# Patient Record
Sex: MALE | Race: WHITE | NOT HISPANIC OR LATINO | URBAN - METROPOLITAN AREA
[De-identification: names, ages, dates, MRNs, and addresses within clinical notes are randomized per-mention and may not be internally consistent; named-entity substitution may affect disease eponyms.]

---

## 2019-09-06 ENCOUNTER — OUTPATIENT (OUTPATIENT)
Dept: OUTPATIENT SERVICES | Facility: HOSPITAL | Age: 59
LOS: 1 days | End: 2019-09-06
Payer: COMMERCIAL

## 2019-09-06 DIAGNOSIS — M54.5 LOW BACK PAIN: ICD-10-CM

## 2019-09-06 DIAGNOSIS — M25.551 PAIN IN RIGHT HIP: ICD-10-CM

## 2019-09-06 PROCEDURE — 72100 X-RAY EXAM L-S SPINE 2/3 VWS: CPT | Mod: 26

## 2019-09-06 PROCEDURE — 73502 X-RAY EXAM HIP UNI 2-3 VIEWS: CPT | Mod: 26,RT

## 2019-09-06 PROCEDURE — 72100 X-RAY EXAM L-S SPINE 2/3 VWS: CPT

## 2019-09-06 PROCEDURE — 73502 X-RAY EXAM HIP UNI 2-3 VIEWS: CPT

## 2019-09-11 ENCOUNTER — APPOINTMENT (OUTPATIENT)
Dept: ORTHOPEDIC SURGERY | Facility: CLINIC | Age: 59
End: 2019-09-11

## 2019-09-18 ENCOUNTER — OUTPATIENT (OUTPATIENT)
Dept: OUTPATIENT SERVICES | Facility: HOSPITAL | Age: 59
LOS: 1 days | End: 2019-09-18
Payer: COMMERCIAL

## 2019-09-18 ENCOUNTER — APPOINTMENT (OUTPATIENT)
Dept: MRI IMAGING | Facility: HOSPITAL | Age: 59
End: 2019-09-18
Payer: COMMERCIAL

## 2019-09-18 DIAGNOSIS — Z00.8 ENCOUNTER FOR OTHER GENERAL EXAMINATION: ICD-10-CM

## 2019-09-18 PROCEDURE — 72148 MRI LUMBAR SPINE W/O DYE: CPT

## 2019-09-18 PROCEDURE — 72148 MRI LUMBAR SPINE W/O DYE: CPT | Mod: 26

## 2020-01-26 ENCOUNTER — TRANSCRIPTION ENCOUNTER (OUTPATIENT)
Age: 60
End: 2020-01-26

## 2020-09-10 ENCOUNTER — TRANSCRIPTION ENCOUNTER (OUTPATIENT)
Age: 60
End: 2020-09-10

## 2022-11-24 ENCOUNTER — NON-APPOINTMENT (OUTPATIENT)
Age: 62
End: 2022-11-24

## 2022-11-25 ENCOUNTER — EMERGENCY (EMERGENCY)
Facility: HOSPITAL | Age: 62
LOS: 1 days | Discharge: HOME | End: 2022-11-25
Attending: EMERGENCY MEDICINE | Admitting: EMERGENCY MEDICINE
Payer: COMMERCIAL

## 2022-11-25 VITALS
SYSTOLIC BLOOD PRESSURE: 168 MMHG | DIASTOLIC BLOOD PRESSURE: 94 MMHG | RESPIRATION RATE: 20 BRPM | TEMPERATURE: 98 F | WEIGHT: 220.02 LBS | OXYGEN SATURATION: 97 % | HEART RATE: 60 BPM | HEIGHT: 70 IN

## 2022-11-25 LAB
RAPID RVP RESULT: SIGNIFICANT CHANGE UP
SARS-COV-2 RNA SPEC QL NAA+PROBE: SIGNIFICANT CHANGE UP

## 2022-11-25 PROCEDURE — 87081 CULTURE SCREEN ONLY: CPT

## 2022-11-25 PROCEDURE — 0225U NFCT DS DNA&RNA 21 SARSCOV2: CPT

## 2022-11-25 PROCEDURE — 99284 EMERGENCY DEPT VISIT MOD MDM: CPT

## 2022-11-25 PROCEDURE — 99283 EMERGENCY DEPT VISIT LOW MDM: CPT

## 2022-11-25 RX ORDER — DIPHENHYDRAMINE HCL 50 MG
50 CAPSULE ORAL ONCE
Refills: 0 | Status: COMPLETED | OUTPATIENT
Start: 2022-11-25 | End: 2022-11-25

## 2022-11-25 RX ORDER — IBUPROFEN 200 MG
1 TABLET ORAL
Qty: 30 | Refills: 0
Start: 2022-11-25

## 2022-11-25 RX ORDER — DIPHENHYDRAMINE HCL 50 MG
1 CAPSULE ORAL
Qty: 20 | Refills: 0
Start: 2022-11-25

## 2022-11-25 RX ORDER — IBUPROFEN 200 MG
600 TABLET ORAL ONCE
Refills: 0 | Status: COMPLETED | OUTPATIENT
Start: 2022-11-25 | End: 2022-11-25

## 2022-11-25 RX ORDER — EPINEPHRINE 0.3 MG/.3ML
0.3 INJECTION INTRAMUSCULAR; SUBCUTANEOUS
Qty: 1 | Refills: 0
Start: 2022-11-25 | End: 2022-11-25

## 2022-11-25 RX ADMIN — Medication 600 MILLIGRAM(S): at 20:39

## 2022-11-25 RX ADMIN — Medication 1 TABLET(S): at 20:39

## 2022-11-25 RX ADMIN — Medication 50 MILLIGRAM(S): at 20:39

## 2022-11-25 RX ADMIN — Medication 60 MILLIGRAM(S): at 20:38

## 2022-11-25 NOTE — ED PROVIDER NOTE - CARE PROVIDER_API CALL
Devante Ojeda)  Otolaryngology  [address] [place] [address]  Phone: [phone]  Fax: [phone]  Follow Up Time: 1-3 Days

## 2022-11-25 NOTE — ED PROVIDER NOTE - OBJECTIVE STATEMENT
Patient complaining of throat discomfort, moderate, acute onset since 10:30 AM today.  Patient was working in his yard and felt acute discomfort in throat.  He thought maybe a bug flew in his mouth.  Associated with mild throat pain.  Feels like something is flapping around in the back of his throat.  No fever chills cough SOB.  No rash hives itch.  No tongue swelling.  No ab pain nausea vomiting diarrhea.  No new medications or foods.  Seen at urgent care and sent to ED for evaluation.

## 2022-11-25 NOTE — ED PROVIDER NOTE - CLINICAL SUMMARY MEDICAL DECISION MAKING FREE TEXT BOX
Patient with acute onset throat discomfort 10:30 AM today with mild throat pain.  Mild to moderately edematous uvula on exam with few palatal petechiae.  Consistent with uvulitis.  Sudden onset makes it more consistent with allergic reaction or angioedema (NO ACEi use).  Throat pain and palatal petechiae may suggest infectious etiology.  Symptoms have remained stable since onset.  No worsening over 10+ hours.  Check throat culture, RVP.  Will treat with Benadryl prednisone Augmentin.  Follow-up ENT.  Prescribed EpiPen and instructed on use.  Told patient to return for any worsening or potential airway compromise.

## 2022-11-25 NOTE — ED ADULT NURSE NOTE - OBJECTIVE STATEMENT
Pt presents to the ED sent in by urgent care for uvulitis. Pt states he has a sore throat and feels like something is in his throat. Denies any difficulty breathing.

## 2022-11-25 NOTE — ED ADULT NURSE NOTE - NS PRO PASSIVE SMOKE EXP
----- Message from Marek Parry MD sent at 2/19/2020  6:26 AM CST -----  Nonfasting hepatic panel is pending if he still wants to do it  ----- Message -----  From: SYSTEM  Sent: 2/19/2020  12:17 AM CST  To: Marek Parry MD       No

## 2022-11-25 NOTE — ED PROVIDER NOTE - NSFOLLOWUPINSTRUCTIONS_ED_ALL_ED_FT
- see ENT (ear nose throat) doctor in next 1-3 days  - take benadryl 50 mg every 6 hrs for allergic reaction/symptoms  - take prednisone daily  - take augmentin antbiotic  - use epiPen for severe allergic reaction, worse throat swelling, difficult breathing, feeling faint and return immediately to any ER.      Uvulitis    WHAT YOU NEED TO KNOW:    Uvulitis is severe swelling of your uvula. The uvula is the small piece of tissue that hangs in the back of your throat. Uvulitis is usually caused by an infection, an injury to the back of the throat, or an allergic reaction.   Mouth Anatomy         DISCHARGE INSTRUCTIONS:    Medicines:   •Antibiotics: You may need antibiotics if an infection caused your uvulitis. This medicine will help fight infection. Take your antibiotics until they are gone, even if you feel better.      •Steroids:  You may need steroid medicine if an allergic reaction caused your uvulitis. This medicine helps decrease redness, pain, and swelling.      •Antihistamines: You may need antihistamines if an allergic reaction caused your uvulitis. This medicine helps decrease itching.       •Take your medicine as directed. Contact your healthcare provider if you think your medicine is not helping or if you have side effects. Tell your provider if you are allergic to any medicine. Keep a list of the medicines, vitamins, and herbs you take. Include the amounts, and when and why you take them. Bring the list or the pill bottles to follow-up visits. Carry your medicine list with you in case of an emergency.      Follow up with your healthcare provider as directed: Write down your questions so you remember to ask them during your visits.    Contact your healthcare provider if:   •Your signs and symptoms do not get better, even after treatment.      •You have questions or concerns about your condition or treatment.      Return to the emergency department if:   •You have worse trouble swallowing.       •You have trouble breathing.

## 2022-11-25 NOTE — ED PROVIDER NOTE - ENMT, MLM
Airway patent, Nasal mucosa clear. Mouth with normal mucosa. Throat has no vesicles, no oropharyngeal exudates .  normal phonation. swallowing normally. no drooling. uvula edematous, elongated about double normal length, slight erythema. few palatal petechiae.  no lip /tongue swelling

## 2022-11-27 LAB
CULTURE RESULTS: SIGNIFICANT CHANGE UP
SPECIMEN SOURCE: SIGNIFICANT CHANGE UP

## 2024-03-04 ENCOUNTER — APPOINTMENT (OUTPATIENT)
Dept: ORTHOPEDIC SURGERY | Facility: CLINIC | Age: 64
End: 2024-03-04
Payer: COMMERCIAL

## 2024-03-04 ENCOUNTER — NON-APPOINTMENT (OUTPATIENT)
Age: 64
End: 2024-03-04

## 2024-03-04 VITALS
BODY MASS INDEX: 43.43 KG/M2 | DIASTOLIC BLOOD PRESSURE: 81 MMHG | HEIGHT: 61 IN | WEIGHT: 230 LBS | SYSTOLIC BLOOD PRESSURE: 123 MMHG | HEART RATE: 62 BPM

## 2024-03-04 DIAGNOSIS — M47.816 SPONDYLOSIS W/OUT MYELOPATHY OR RADICULOPATHY, LUMBAR REGION: ICD-10-CM

## 2024-03-04 PROBLEM — I10 ESSENTIAL (PRIMARY) HYPERTENSION: Chronic | Status: ACTIVE | Noted: 2022-11-26

## 2024-03-04 PROBLEM — E78.5 HYPERLIPIDEMIA, UNSPECIFIED: Chronic | Status: ACTIVE | Noted: 2022-11-26

## 2024-03-04 PROCEDURE — 99204 OFFICE O/P NEW MOD 45 MIN: CPT

## 2024-03-04 RX ORDER — MELOXICAM 15 MG/1
15 TABLET ORAL
Qty: 30 | Refills: 2 | Status: ACTIVE | COMMUNITY
Start: 2024-03-04 | End: 1900-01-01

## 2024-03-04 NOTE — PHYSICAL EXAM
[LE] : 5/5 motor strength in bilateral lower extremities [Normal] : Oriented to person, place, and time, insight and judgement were intact and the affect was normal [de-identified] : MRI evaluation of the lumbar spine obtained on March 15, 2023, reveals multilevel degenerative change.  Personal review shows significant degeneration without significant stenosis or herniated disc.  No significant neural compressive lesions are noted.

## 2024-03-04 NOTE — ADDENDUM
[FreeTextEntry1] : I, Tho Alfredo Jr, acted solely as a scribe for Dr. Adam Peralta on this date 03/04/2024 .  All medical record entries made by the Scribe were at my, Dr. Adam Peralta, direction and personally dictated by me on 03/04/2024 . I have reviewed the chart and agree that the record accurately reflects my personal performance of the history, physical exam, assessment and plan. I have also personally directed, reviewed, and agreed with the chart.

## 2024-03-04 NOTE — DISCUSSION/SUMMARY
[PRN] : PRN [Medication Risks Reviewed] : Medication risks reviewed [de-identified] : I discussed the underlying pathophysiology of the patient's condition & MRI findings. 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 The patient and I discussed his options regarding treatment. At this time, I explained to the patient that surgery is not warranted and should continue with his course of conservative treatment as the patient's degenerative arthritis is throughout the entire lumbar spine making it inoperable. Activity modifications were reviewed with the patient at length. I advised the patient that he should focus on core strengthening and hamstring stretches. I also recommended the patient to consider acupuncture to aid his symptoms. If the patient begins to experience any changes or severe exacerbation of his symptoms, he should reach out to me as soon as possible.  The patient has meloxicam at home we will try that for flareups.  Otherwise, he should return to me as needed.

## 2024-03-04 NOTE — HISTORY OF PRESENT ILLNESS
[___ yrs] : [unfilled] year(s) ago [Sitting] : sitting [Lifting] : lifting [Intermit.] : ~He/She~ states the symptoms seem to be intermittent [Prolonged Standing] : worsened by prolonged standing [Prolonged Sitting] : worsened by prolonged sitting [de-identified] : A 63-year-old male presents an initial visit for lower back pain. Referred by chiropractor. The patient states that he has been experiencing significant lower back pain which has been ongoing for several years, without any prior accident or injuries. The patient states that he has tried chiropractic, injections, physical therapy, and taken medication but continues to have pain despite these forms of conservative treatment. He states that he finds the most pain when he wakes up in the morning and with prolonged periods of sitting. He states that he does not have any discomfort to his lower extremities. The patient denies any symptoms of numbness, tingling, or weakness. He only finds temporary relief with physical therapy. The patient currently takes Aleve to aid his current symptoms.

## 2024-05-19 ENCOUNTER — NON-APPOINTMENT (OUTPATIENT)
Age: 64
End: 2024-05-19

## 2024-11-11 ENCOUNTER — NON-APPOINTMENT (OUTPATIENT)
Age: 64
End: 2024-11-11

## 2025-03-12 ENCOUNTER — APPOINTMENT (OUTPATIENT)
Dept: ORTHOPEDIC SURGERY | Facility: CLINIC | Age: 65
End: 2025-03-12

## 2025-05-06 ENCOUNTER — NON-APPOINTMENT (OUTPATIENT)
Age: 65
End: 2025-05-06

## 2025-05-07 ENCOUNTER — APPOINTMENT (OUTPATIENT)
Dept: ORTHOPEDIC SURGERY | Facility: CLINIC | Age: 65
End: 2025-05-07
Payer: MEDICARE

## 2025-05-07 DIAGNOSIS — M25.552 PAIN IN LEFT HIP: ICD-10-CM

## 2025-05-07 DIAGNOSIS — M24.852 OTHER SPECIFIC JOINT DERANGEMENTS OF LEFT HIP, NOT ELSEWHERE CLASSIFIED: ICD-10-CM

## 2025-05-07 PROCEDURE — 73502 X-RAY EXAM HIP UNI 2-3 VIEWS: CPT

## 2025-05-07 PROCEDURE — 99204 OFFICE O/P NEW MOD 45 MIN: CPT

## 2025-05-07 RX ORDER — METHYLPREDNISOLONE 4 MG/1
4 TABLET ORAL
Qty: 1 | Refills: 0 | Status: ACTIVE | COMMUNITY
Start: 2025-05-07 | End: 1900-01-01

## 2025-05-08 ENCOUNTER — RESULT REVIEW (OUTPATIENT)
Age: 65
End: 2025-05-08

## 2025-05-08 ENCOUNTER — APPOINTMENT (OUTPATIENT)
Dept: MRI IMAGING | Facility: HOSPITAL | Age: 65
End: 2025-05-08
Payer: MEDICARE

## 2025-05-08 ENCOUNTER — APPOINTMENT (OUTPATIENT)
Dept: RADIOLOGY | Facility: HOSPITAL | Age: 65
End: 2025-05-08
Payer: MEDICARE

## 2025-05-08 ENCOUNTER — OUTPATIENT (OUTPATIENT)
Dept: OUTPATIENT SERVICES | Facility: HOSPITAL | Age: 65
LOS: 1 days | End: 2025-05-08
Payer: MEDICARE

## 2025-05-08 DIAGNOSIS — M24.852 OTHER SPECIFIC JOINT DERANGEMENTS OF LEFT HIP, NOT ELSEWHERE CLASSIFIED: ICD-10-CM

## 2025-05-08 PROCEDURE — 73721 MRI JNT OF LWR EXTRE W/O DYE: CPT | Mod: 26,LT

## 2025-05-08 PROCEDURE — 74018 RADEX ABDOMEN 1 VIEW: CPT

## 2025-05-08 PROCEDURE — 74018 RADEX ABDOMEN 1 VIEW: CPT | Mod: 26

## 2025-05-08 PROCEDURE — 71045 X-RAY EXAM CHEST 1 VIEW: CPT | Mod: 26

## 2025-05-08 PROCEDURE — 73721 MRI JNT OF LWR EXTRE W/O DYE: CPT

## 2025-05-08 PROCEDURE — 71045 X-RAY EXAM CHEST 1 VIEW: CPT

## 2025-05-14 ENCOUNTER — APPOINTMENT (OUTPATIENT)
Dept: ORTHOPEDIC SURGERY | Facility: CLINIC | Age: 65
End: 2025-05-14
Payer: MEDICARE

## 2025-05-14 PROCEDURE — 20610 DRAIN/INJ JOINT/BURSA W/O US: CPT | Mod: LT

## 2025-05-14 PROCEDURE — 99213 OFFICE O/P EST LOW 20 MIN: CPT | Mod: 25

## 2025-05-14 RX ORDER — LIDOCAINE HCL/PF 10 MG/ML
1 VIAL (ML) INJECTION
Refills: 0 | Status: COMPLETED | OUTPATIENT
Start: 2025-05-14

## 2025-05-14 RX ORDER — METHYLPRED ACET/NACL,ISO-OS/PF 40 MG/ML
40 VIAL (ML) INJECTION
Refills: 0 | Status: COMPLETED | OUTPATIENT
Start: 2025-05-14

## 2025-05-14 RX ADMIN — METHYLPREDNISOLONE ACETATE 2 MG/ML: 40 INJECTION, SUSPENSION INTRA-ARTICULAR; INTRALESIONAL; INTRAMUSCULAR; SOFT TISSUE at 00:00

## 2025-05-14 RX ADMIN — LIDOCAINE HYDROCHLORIDE 4 %: 10 INJECTION, SOLUTION INFILTRATION; PERINEURAL at 00:00

## 2025-06-03 ENCOUNTER — APPOINTMENT (OUTPATIENT)
Dept: ORTHOPEDIC SURGERY | Facility: CLINIC | Age: 65
End: 2025-06-03
Payer: MEDICARE

## 2025-06-03 PROCEDURE — 99214 OFFICE O/P EST MOD 30 MIN: CPT

## 2025-06-14 ENCOUNTER — NON-APPOINTMENT (OUTPATIENT)
Age: 65
End: 2025-06-14

## 2025-06-23 ENCOUNTER — RESULT REVIEW (OUTPATIENT)
Age: 65
End: 2025-06-23

## 2025-06-23 ENCOUNTER — OUTPATIENT (OUTPATIENT)
Dept: OUTPATIENT SERVICES | Facility: HOSPITAL | Age: 65
LOS: 1 days | End: 2025-06-23
Payer: MEDICARE

## 2025-06-23 ENCOUNTER — APPOINTMENT (OUTPATIENT)
Dept: ULTRASOUND IMAGING | Facility: CLINIC | Age: 65
End: 2025-06-23

## 2025-06-23 DIAGNOSIS — M24.852 OTHER SPECIFIC JOINT DERANGEMENTS OF LEFT HIP, NOT ELSEWHERE CLASSIFIED: ICD-10-CM
